# Patient Record
Sex: FEMALE | Race: WHITE | NOT HISPANIC OR LATINO | Employment: OTHER | RURAL
[De-identification: names, ages, dates, MRNs, and addresses within clinical notes are randomized per-mention and may not be internally consistent; named-entity substitution may affect disease eponyms.]

---

## 2021-08-03 ENCOUNTER — INFUSION (OUTPATIENT)
Dept: INFECTIOUS DISEASES | Facility: HOSPITAL | Age: 81
End: 2021-08-03
Attending: FAMILY MEDICINE
Payer: MEDICARE

## 2021-08-03 VITALS
BODY MASS INDEX: 22.15 KG/M2 | WEIGHT: 125 LBS | RESPIRATION RATE: 18 BRPM | OXYGEN SATURATION: 96 % | SYSTOLIC BLOOD PRESSURE: 134 MMHG | DIASTOLIC BLOOD PRESSURE: 55 MMHG | HEIGHT: 63 IN | HEART RATE: 81 BPM | TEMPERATURE: 99 F

## 2021-08-03 DIAGNOSIS — U07.1 COVID-19: Primary | ICD-10-CM

## 2021-08-03 PROCEDURE — 25000003 PHARM REV CODE 250: Performed by: NURSE PRACTITIONER

## 2021-08-03 PROCEDURE — 63600175 PHARM REV CODE 636 W HCPCS: Performed by: NURSE PRACTITIONER

## 2021-08-03 PROCEDURE — M0243 CASIRIVI AND IMDEVI INFUSION: HCPCS | Performed by: NURSE PRACTITIONER

## 2021-08-03 RX ORDER — DIPHENHYDRAMINE HYDROCHLORIDE 50 MG/ML
25 INJECTION INTRAMUSCULAR; INTRAVENOUS ONCE AS NEEDED
Status: DISPENSED | OUTPATIENT
Start: 2021-08-03 | End: 2032-12-30

## 2021-08-03 RX ORDER — ACETAMINOPHEN 325 MG/1
650 TABLET ORAL ONCE AS NEEDED
Status: DISPENSED | OUTPATIENT
Start: 2021-08-03 | End: 2032-12-30

## 2021-08-03 RX ORDER — EPINEPHRINE 0.3 MG/.3ML
0.3 INJECTION SUBCUTANEOUS
Status: DISPENSED | OUTPATIENT
Start: 2021-08-03

## 2021-08-03 RX ORDER — ALBUTEROL SULFATE 90 UG/1
2 AEROSOL, METERED RESPIRATORY (INHALATION)
Status: DISPENSED | OUTPATIENT
Start: 2021-08-03

## 2021-08-03 RX ORDER — ONDANSETRON 4 MG/1
4 TABLET, ORALLY DISINTEGRATING ORAL ONCE AS NEEDED
Status: DISPENSED | OUTPATIENT
Start: 2021-08-03 | End: 2032-12-30

## 2021-08-03 RX ORDER — SODIUM CHLORIDE 0.9 % (FLUSH) 0.9 %
10 SYRINGE (ML) INJECTION
Status: DISPENSED | OUTPATIENT
Start: 2021-08-03

## 2021-08-03 RX ADMIN — CASIRIVIMAB AND IMDEVIMAB 600 MG: 600; 600 INJECTION, SOLUTION, CONCENTRATE INTRAVENOUS at 03:08

## 2021-08-04 ENCOUNTER — TELEPHONE (OUTPATIENT)
Dept: EMERGENCY MEDICINE | Facility: HOSPITAL | Age: 81
End: 2021-08-04

## 2022-05-06 ENCOUNTER — OFFICE VISIT (OUTPATIENT)
Dept: DERMATOLOGY | Facility: CLINIC | Age: 82
End: 2022-05-06
Payer: MEDICARE

## 2022-05-06 VITALS — WEIGHT: 125 LBS | BODY MASS INDEX: 22.15 KG/M2 | HEIGHT: 63 IN | RESPIRATION RATE: 18 BRPM

## 2022-05-06 DIAGNOSIS — L30.9 DERMATITIS, UNSPECIFIED: Primary | ICD-10-CM

## 2022-05-06 PROCEDURE — 99204 PR OFFICE/OUTPT VISIT, NEW, LEVL IV, 45-59 MIN: ICD-10-PCS | Mod: ,,, | Performed by: STUDENT IN AN ORGANIZED HEALTH CARE EDUCATION/TRAINING PROGRAM

## 2022-05-06 PROCEDURE — 99204 OFFICE O/P NEW MOD 45 MIN: CPT | Mod: ,,, | Performed by: STUDENT IN AN ORGANIZED HEALTH CARE EDUCATION/TRAINING PROGRAM

## 2022-05-06 RX ORDER — HYDROCHLOROTHIAZIDE 25 MG/1
25 TABLET ORAL DAILY
COMMUNITY
Start: 2021-11-30

## 2022-05-06 RX ORDER — POTASSIUM CHLORIDE 750 MG/1
10 TABLET, EXTENDED RELEASE ORAL DAILY
COMMUNITY
Start: 2021-11-30

## 2022-05-06 RX ORDER — HYDROCORTISONE 25 MG/G
CREAM TOPICAL
COMMUNITY
Start: 2022-05-03

## 2022-05-06 RX ORDER — DOXYCYCLINE 100 MG/1
100 CAPSULE ORAL EVERY 12 HOURS
Qty: 42 CAPSULE | Refills: 0 | Status: SHIPPED | OUTPATIENT
Start: 2022-05-06 | End: 2022-05-27

## 2022-05-06 NOTE — PATIENT INSTRUCTIONS
Doxycycline Counseling:     Please be aware of the side effects of doxycycline:   - photosensitivity and increased risk for sunburn. When exposed to sunlight, patients should wear protective clothing, sunglasses, and sunscreen.   - birth defects: avoid pregnancy while on therapy due to potential birth defects.  - headaches or vision changes if taking with accutane   - throat irritation: stay upright for at least 30 minutes after taking and drink with a large glass of water   - GI disturbance: take with food to help prevent upset stomach   - Do not take at the same time as dairy or calcium containing supplements, as they reduce absorption. You can continue to consume these, but make sure it is not within an hour of taking the doxycycline     Please call our office with any extreme side effects.

## 2022-05-06 NOTE — PROGRESS NOTES
[facility]  Alejandro Carreon MD    [address] [place] [address]  [phone]    Fax: [phone]    Patient Name: Gisela Cohen  Medical Record Number: [id number]  PCP: Frank Loza MD  Age: 81 y.o. : 1940  Contact: [phone] (home)     CC: Rash  History of Present Illness:     Gisela Cohen is a 81 y.o.  female with no history of skin cancer  who presents to clinic today for rash on face.  This has been present for over three months. She states she noticed it after she was mowing grass and was wearing a mask. Symptoms include redness, swelling, burning. Previous treatments include tacrolimus ointment, hydrocortisone 2.5% cream, mupirocin, clindamycin gel (the only thing that has improved it).       The patient has no other concerns today.    Review of Systems:     Unremarkable other than mentioned above.     Physical Exam:     General: Relaxed, oriented, alert    Skin examination of the scalp, face, neck, chest, back, abdomen, upper extremities and lower extremities were normal except for as listed below                Assessment and Plan:     1. Dermatitis Unspecified  - violaceous scaly plaques on bilateral cheeks.     DDx: Rosacea vs allergic contact dermatitis vs other   - Doxycycline 100 mg BID for three weeks  - will biopsy on next visit if not resolved.   - continue Clindamycin Gel.     Plan: Counseling  I counseled the patient regarding the following:  Skin care: Patient instructed to use gentle skin care including dove unscented soap, CeraVe moisturizing cream, and fragrance free laundry detergent.  Expectations: The patient understands that there is not a definitive diagnosis at this time. Further testing or empiric therapy may be necessary to diagnose and improve the condition.  Contact office if: The patient develops a fever, or rash dramatically worsens despite treatment.      2. High Risk Medication Monitoring : The risks and benefits of the  medication were reviewed in full with the patient. Should any side effects occur, the patient will stop the medication and contact me immediately.    Doxycycline Counseling:     Please be aware of the side effects of doxycycline:   - photosensitivity and increased risk for sunburn. When exposed to sunlight, patients should wear protective clothing, sunglasses, and sunscreen.   - birth defects: avoid pregnancy while on therapy due to potential birth defects.  - headaches or vision changes if taking with accutane   - throat irritation: stay upright for at least 30 minutes after taking and drink with a large glass of water   - GI disturbance: take with food to help prevent upset stomach   - Do not take at the same time as dairy or calcium containing supplements, as they reduce absorption. You can continue to consume these, but make sure it is not within an hour of taking the doxycycline     Please call our office with any extreme side effects.         Return to clinic in three weeks    AVS printed with patient instructions     Alejandro Carreon MD   Mohs Surgery/Dermatologic Oncology  Dermatology

## 2022-05-27 ENCOUNTER — OFFICE VISIT (OUTPATIENT)
Dept: DERMATOLOGY | Facility: CLINIC | Age: 82
End: 2022-05-27
Payer: MEDICARE

## 2022-05-27 VITALS — HEIGHT: 63 IN | WEIGHT: 125 LBS | BODY MASS INDEX: 22.15 KG/M2 | RESPIRATION RATE: 20 BRPM

## 2022-05-27 DIAGNOSIS — L30.9 DERMATITIS: Primary | ICD-10-CM

## 2022-05-27 PROCEDURE — 99214 OFFICE O/P EST MOD 30 MIN: CPT | Mod: ,,, | Performed by: STUDENT IN AN ORGANIZED HEALTH CARE EDUCATION/TRAINING PROGRAM

## 2022-05-27 PROCEDURE — 99214 PR OFFICE/OUTPT VISIT, EST, LEVL IV, 30-39 MIN: ICD-10-PCS | Mod: ,,, | Performed by: STUDENT IN AN ORGANIZED HEALTH CARE EDUCATION/TRAINING PROGRAM

## 2022-05-27 RX ORDER — METRONIDAZOLE 7.5 MG/G
GEL TOPICAL 2 TIMES DAILY
Qty: 45 G | Refills: 5 | Status: SHIPPED | OUTPATIENT
Start: 2022-05-27 | End: 2023-05-27

## 2022-05-27 RX ORDER — CLINDAMYCIN PHOSPHATE 10 MG/G
GEL TOPICAL 2 TIMES DAILY
Qty: 30 G | Refills: 5 | Status: SHIPPED | OUTPATIENT
Start: 2022-05-27

## 2022-05-27 NOTE — PROGRESS NOTES
[facility]  Alejandro Carreon MD    [address] [place] [address], [place] [address]  [phone]    Fax: [phone]    Patient Name: Gisela Cohen  Medical Record Number: [id number]  PCP: Frank Loza MD  Age: 81 y.o. : 1940  Contact: [phone] (home)     History of Present Illness:     Gisela Cohen is a 81 y.o.  female here for follow up of rash of her face favored to be rosacea vs ACD. Patient last seen by dermatology 22. Current therapies include doxycycline 100 mg Bid and clindamycin gel. Patient states she was unable to continue the doxycycline after 2 weeks due to upset stomach. The rash has resolved.     The patient has no other concerns today.    Review of Systems:     Unremarkable other than mentioned above.     Physical Exam:     General: Relaxed, oriented, alert    Skin examination of the scalp, face, neck, chest, back, abdomen, upper extremities and lower extremities were normal except for as listed below      Assessment and Plan:     1. Dermatitis Unspecified  - no rash today   DDX: ACD vs rosace a     Plan:    Metronidazole gel bid PRN    Continue Clindamycin gel as she feels this improves it    Hydrocortisone 2.5% cream PRN for pruritus     Counseling.  Rosacea can be a/w cardiac disease, and can involve the eyes as well.           Return to clinic in 2 months.    AVS printed with patient instructions     Alejandro Carreon MD   Mohs Surgery/Dermatologic Oncology  Dermatology

## 2022-09-02 ENCOUNTER — OFFICE VISIT (OUTPATIENT)
Dept: DERMATOLOGY | Facility: CLINIC | Age: 82
End: 2022-09-02
Payer: MEDICARE

## 2022-09-02 VITALS — HEIGHT: 63 IN | RESPIRATION RATE: 16 BRPM | WEIGHT: 125 LBS | BODY MASS INDEX: 22.15 KG/M2

## 2022-09-02 DIAGNOSIS — L71.9 ROSACEA: Primary | ICD-10-CM

## 2022-09-02 PROCEDURE — 99213 OFFICE O/P EST LOW 20 MIN: CPT | Mod: ,,, | Performed by: STUDENT IN AN ORGANIZED HEALTH CARE EDUCATION/TRAINING PROGRAM

## 2022-09-02 PROCEDURE — 99213 PR OFFICE/OUTPT VISIT, EST, LEVL III, 20-29 MIN: ICD-10-PCS | Mod: ,,, | Performed by: STUDENT IN AN ORGANIZED HEALTH CARE EDUCATION/TRAINING PROGRAM

## 2022-09-02 NOTE — PROGRESS NOTES
[facility]  Alejandro Carreon MD    [address] [place] [address]  [phone]    Fax: [phone]    Patient Name: Gisela Cohen  Medical Record Number: [id number]  PCP: Frank Loza MD  Age: 82 y.o. : 1940  Contact: [phone] (home)     History of Present Illness:     Gisela Cohen is a 82 y.o.  female here for follow up of rosacea. Patient last seen by dermatology 22. Current therapies include metronidazole gel. The rosacea has improved.     The patient has no other concerns today.    Review of Systems:     Unremarkable other than mentioned above.     Physical Exam:     General: Relaxed, oriented, alert    Skin examination of the scalp, face, neck, chest, back, abdomen, upper extremities and lower extremities were normal except for as listed below      Assessment and Plan:     1. Rosacea  - Erythematous papules and pustules on cheeks and nose with background erythema     Plan:    Continue Metronidazole gel bid PRN    Patient will follow up with Dr. Keith for dryness in eye which could represent ocular rosacea     Counseling.  Rosacea can be a/w cardiac disease, and can involve the eyes as well.         Return to clinic in 1 year.    AVS printed with patient instructions     Alejandro Carreon MD   Mohs Surgery/Dermatologic Oncology  Dermatology

## 2022-11-15 ENCOUNTER — OFFICE VISIT (OUTPATIENT)
Dept: DERMATOLOGY | Facility: CLINIC | Age: 82
End: 2022-11-15
Payer: MEDICARE

## 2022-11-15 VITALS — BODY MASS INDEX: 22.15 KG/M2 | HEIGHT: 63 IN | WEIGHT: 125 LBS

## 2022-11-15 DIAGNOSIS — D48.5 NEOPLASM OF UNCERTAIN BEHAVIOR OF SKIN: Primary | ICD-10-CM

## 2022-11-15 DIAGNOSIS — L82.0 SEBORRHEIC KERATOSES, INFLAMED: ICD-10-CM

## 2022-11-15 DIAGNOSIS — L71.9 ROSACEA: ICD-10-CM

## 2022-11-15 PROCEDURE — 11102 PR TANGENTIAL BIOPSY, SKIN, SINGLE LESION: ICD-10-PCS | Mod: XS,,, | Performed by: STUDENT IN AN ORGANIZED HEALTH CARE EDUCATION/TRAINING PROGRAM

## 2022-11-15 PROCEDURE — 11102 TANGNTL BX SKIN SINGLE LES: CPT | Mod: XS,,, | Performed by: STUDENT IN AN ORGANIZED HEALTH CARE EDUCATION/TRAINING PROGRAM

## 2022-11-15 PROCEDURE — 99213 OFFICE O/P EST LOW 20 MIN: CPT | Mod: 25,,, | Performed by: STUDENT IN AN ORGANIZED HEALTH CARE EDUCATION/TRAINING PROGRAM

## 2022-11-15 PROCEDURE — 88305 TISSUE EXAM BY PATHOLOGIST: CPT | Mod: 26,,, | Performed by: PATHOLOGY

## 2022-11-15 PROCEDURE — 88305 TISSUE EXAM BY PATHOLOGIST: CPT | Mod: SUR | Performed by: STUDENT IN AN ORGANIZED HEALTH CARE EDUCATION/TRAINING PROGRAM

## 2022-11-15 PROCEDURE — 88305 PATHOLOGY, DERMATOLOGY: ICD-10-PCS | Mod: 26,,, | Performed by: PATHOLOGY

## 2022-11-15 PROCEDURE — 99213 PR OFFICE/OUTPT VISIT, EST, LEVL III, 20-29 MIN: ICD-10-PCS | Mod: 25,,, | Performed by: STUDENT IN AN ORGANIZED HEALTH CARE EDUCATION/TRAINING PROGRAM

## 2022-11-15 PROCEDURE — 17110 PR DESTRUCTION BENIGN LESIONS UP TO 14: ICD-10-PCS | Mod: ,,, | Performed by: STUDENT IN AN ORGANIZED HEALTH CARE EDUCATION/TRAINING PROGRAM

## 2022-11-15 PROCEDURE — 17110 DESTRUCTION B9 LES UP TO 14: CPT | Mod: ,,, | Performed by: STUDENT IN AN ORGANIZED HEALTH CARE EDUCATION/TRAINING PROGRAM

## 2022-11-15 NOTE — PROGRESS NOTES
[facility]  Alejandro Carreon MD    [address] [place] [address]  [phone]    Fax: [phone]    Patient Name: Gisela Cohen  Medical Record Number: [id number]  PCP: Frank Loza MD  Age: 82 y.o. : 1940  Contact: [phone] (home)     History of Present Illness:     Gisela Cohen is a 82 y.o.  female here for follow up of rosacea. Previous treatment includes  Metronidazole gel bid PRN with improvement. Today, she is complaining of a skin lesion of the mid chest that is tender.     The patient has no other concerns today.    Review of Systems:     Unremarkable other than mentioned above.     Physical Exam:     General: Relaxed, oriented, alert    Skin examination of the scalp, face, neck, chest, back, abdomen, upper extremities and lower extremities were normal except for as listed below        Assessment and Plan:     1. Neoplasm of Uncertain Behavior   - scaly plaque located on the mid chest  Ddx includes: SCC vs ISK    Shave biopsy      Pre-procedure Diagnosis: as above  Post_procedure Diagnosis: same  Estimated Blood Loss: <1cc    Findings: None  Complications: None  Specimens: to pathology      Written informed consent was obtained after discussing risks including pain, bleeding, infection, recurrence and scarring. The biopsy site was sterilely prepped with alcohol, which was allowed to dry completely, then locally infiltrated with 1% lidocaine with epinephrine, ~3 cc total. A shave biopsy was obtained using a Dermablade/15 and the specimen was sent to dermatopathology. Aluminum chloride was used for hemostasis. Antibiotic ointment and a clean dressing were applied. The patient tolerated the procedure well without complications. Verbal and written wound care instructions were given.    Alejandro Carreon MD     2. Irritated Seborrheic Keratoses (L82.0)  Stuck-on inflamed papules with crust located on the cheek   Associated diagnoses: Pruritus and Cutaneous  Inflammation    Plan: Liquid Nitrogen.  A total of 1 lesions were treated with liquid nitrogen, located on the above listed location.  This procedure was medically necessary because the lesions that were treated were: irritated and itchy. The  patient's consent was obtained including but not limited to risks of crusting, scabbing, blistering, scarring, darker  or lighter pigmentary change, recurrence, incomplete removal and infection.    3. Rosacea  - Erythematous papules and pustules on cheeks and nose with background erythema      Plan:    Continue Metronidazole gel bid PRN    Patient will follow up with Dr. Keith for dryness in eye which could represent ocular rosacea      Counseling.  Rosacea can be a/w cardiac disease, and can involve the eyes as well.   Return to clinic in 1 year.     AVS printed with patient instructions     Alejandro Carreon MD   Mohs Surgery/Dermatologic Oncology  Dermatology

## 2022-11-17 LAB
ESTROGEN SERPL-MCNC: NORMAL PG/ML
INSULIN SERPL-ACNC: NORMAL U[IU]/ML
LAB AP GROSS DESCRIPTION: NORMAL
LAB AP LABORATORY NOTES: NORMAL
LAB AP SPEC A DDX: NORMAL
LAB AP SPEC A MORPHOLOGY: NORMAL
LAB AP SPEC A PROCEDURE: NORMAL
T3RU NFR SERPL: NORMAL %

## 2022-11-22 NOTE — PATIENT INSTRUCTIONS

## 2022-11-23 ENCOUNTER — PROCEDURE VISIT (OUTPATIENT)
Dept: DERMATOLOGY | Facility: CLINIC | Age: 82
End: 2022-11-23
Payer: MEDICARE

## 2022-11-23 VITALS — DIASTOLIC BLOOD PRESSURE: 78 MMHG | SYSTOLIC BLOOD PRESSURE: 138 MMHG

## 2022-11-23 DIAGNOSIS — C44.529 SQUAMOUS CELL CARCINOMA OF SKIN OF CHEST: Primary | ICD-10-CM

## 2022-11-23 PROCEDURE — 12032 INTMD RPR S/A/T/EXT 2.6-7.5: CPT | Mod: ,,, | Performed by: STUDENT IN AN ORGANIZED HEALTH CARE EDUCATION/TRAINING PROGRAM

## 2022-11-23 PROCEDURE — 11602 EXC TR-EXT MAL+MARG 1.1-2 CM: CPT | Mod: 51,,, | Performed by: STUDENT IN AN ORGANIZED HEALTH CARE EDUCATION/TRAINING PROGRAM

## 2022-11-23 PROCEDURE — 99499 UNLISTED E&M SERVICE: CPT | Mod: ,,, | Performed by: STUDENT IN AN ORGANIZED HEALTH CARE EDUCATION/TRAINING PROGRAM

## 2022-11-23 PROCEDURE — 88305 TISSUE EXAM BY PATHOLOGIST: CPT | Mod: 26,,, | Performed by: PATHOLOGY

## 2022-11-23 PROCEDURE — 88305 PATHOLOGY, DERMATOLOGY: ICD-10-PCS | Mod: 26,,, | Performed by: PATHOLOGY

## 2022-11-23 PROCEDURE — 12032 PR LAYR CLOS WND TRUNK,ARM,LEG 2.6-7.5 CM: ICD-10-PCS | Mod: ,,, | Performed by: STUDENT IN AN ORGANIZED HEALTH CARE EDUCATION/TRAINING PROGRAM

## 2022-11-23 PROCEDURE — 88305 TISSUE EXAM BY PATHOLOGIST: CPT | Mod: SUR | Performed by: STUDENT IN AN ORGANIZED HEALTH CARE EDUCATION/TRAINING PROGRAM

## 2022-11-23 PROCEDURE — 11602 PR EXC SKIN MALIG 1.1-2 CM TRUNK,ARM,LEG: ICD-10-PCS | Mod: 51,,, | Performed by: STUDENT IN AN ORGANIZED HEALTH CARE EDUCATION/TRAINING PROGRAM

## 2022-11-23 PROCEDURE — 99499 NO LOS: ICD-10-PCS | Mod: ,,, | Performed by: STUDENT IN AN ORGANIZED HEALTH CARE EDUCATION/TRAINING PROGRAM

## 2022-11-23 NOTE — PROGRESS NOTES
Excision Consult Note    Gisela Cohen is a 82 y.o. female who is referred by Dr. Carreon for evaluation of a SCC on the mid chest.     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    Pathologic Findings:  Accession # [id number]  Diagnosis: SCC    Assessment and Plan:  Treatment Options : Given the indications and high cure rate, the patient has agreed to proceed with excision  Risks and Benefits : The rationale for excision was explained to the patient. The risks and benefits to therapy were discussed in detail. Specifically, the risks of infection, scarring, bleeding, dehiscence, hematoma, prolonged wound healing, incomplete removal, allergy to anesthesia, nerve injury, inability to clear the lesion and recurrence were addressed. The treatment site was clearly identified and confirmed by the patient.    Plan:  Excision    Alejandro Carreon MD   Mohs Surgery/Dermatologic Oncology

## 2022-11-23 NOTE — PROGRESS NOTES
[facility]    Alejandro Carreon MD    Elliptical Excision with Intermediate Closure    Tumor Type: SCC  Location:  mid chest  Derm-Path Accession #:  [id number]  Lesion Size:  1.0 x 0.8 cm  Surgical Margins: 0.5 cm  Post op size: 2.0 x 1.8 cm  Level of Defect:  fat  Repair Type:  Intermediate linear   Repair Length:  5 cm   Sutures: 3-0 PDS, 4-0 Prolene    Primary Surgeon: DUDLEY Carreon MD      INDICATIONS:  The risks of bleeding, infection, discomfort, incomplete removal, and scar formation were explained to the patient.  All questions were answered.  After informed consent, confirmation of site and identity, and appropriate instructions, the patient underwent the procedure as follows:    PROCEDURE:  With the patient in a supine position, the lesion was outlined with the above margins. An ellipse was designed around the lesion to conform to relaxed skin tension lines in an effort to minimize scarring and deformity.  The patient was then placed in a supine position.  The lesion and surrounding skin were prepped with chlorhexidine, draped, and anesthetized with 1% lidocaine with epinephrine 1:100,100 buffered with 1:10 sodium bicarbonate.  Using a #15 blade, the skin was excised along premarked lines.  The resulting defect extended through deep subcutaneous tissue.  Wound margins were undermined to limit functional deformity/impairment of adjacent structures.  Bleeding vessels were controlled with  monopolar  electrodessication .  The dermis and subcutaneous tissue were closed with buried vertical mattress sutures.  Epidermal approximation was meticulously refined with simple running sutures, resulting in a linear closure with little to no wound tension.  Blood loss was estimated to be less than 5cc.  The area was coated with petrolatum and covered with a non-adherent dressing followed by gauze and tape.  Postoperative instructions were reviewed per protocol.  The patient left alert and fully  oriented.        Alejandro Carreon MD

## 2022-12-05 ENCOUNTER — CLINICAL SUPPORT (OUTPATIENT)
Dept: DERMATOLOGY | Facility: CLINIC | Age: 82
End: 2022-12-05
Payer: MEDICARE

## 2022-12-05 DIAGNOSIS — Z48.89 ENCOUNTER FOR POST SURGICAL WOUND CHECK: ICD-10-CM

## 2022-12-05 DIAGNOSIS — L82.0 INFLAMED SEBORRHEIC KERATOSIS: Primary | ICD-10-CM

## 2022-12-05 PROCEDURE — 17110 DESTRUCTION B9 LES UP TO 14: CPT | Mod: ,,, | Performed by: STUDENT IN AN ORGANIZED HEALTH CARE EDUCATION/TRAINING PROGRAM

## 2022-12-05 PROCEDURE — 17110 PR DESTRUCTION BENIGN LESIONS UP TO 14: ICD-10-PCS | Mod: ,,, | Performed by: STUDENT IN AN ORGANIZED HEALTH CARE EDUCATION/TRAINING PROGRAM

## 2022-12-05 PROCEDURE — 99499 NO LOS: ICD-10-PCS | Mod: ,,, | Performed by: STUDENT IN AN ORGANIZED HEALTH CARE EDUCATION/TRAINING PROGRAM

## 2022-12-05 PROCEDURE — 99499 UNLISTED E&M SERVICE: CPT | Mod: ,,, | Performed by: STUDENT IN AN ORGANIZED HEALTH CARE EDUCATION/TRAINING PROGRAM

## 2022-12-05 NOTE — PROGRESS NOTES
Elliptical Excision with Intermediate Closure     Tumor Type: SCC  Location:  mid chest  Derm-Path Accession #:  [id number]  Lesion Size:  1.0 x 0.8 cm  Surgical Margins: 0.5 cm  Post op size: 2.0 x 1.8 cm  Level of Defect:  fat  Repair Type:  Intermediate linear   Repair Length:  5 cm   Sutures: 3-0 PDS, 4-0 Prolene    Sutures removed. Patient tolerated well. Incision site healing well.     Aylin Oneal RN        Patient also complains of pruritic, irritated lesions on R cheek and mid back.     Irritated Seborrheic Keratoses (L82.0)  Stuck-on inflamed papules with crust located on the L cheek and mid back  Associated diagnoses: Pruritus and Cutaneous Inflammation    Plan: Liquid Nitrogen.  A total of 2 lesions were treated with liquid nitrogen, located on the above listed location.  This procedure was medically necessary because the lesions that were treated were: irritated and itchy. The  patient's consent was obtained including but not limited to risks of crusting, scabbing, blistering, scarring, darker  or lighter pigmentary change, recurrence, incomplete removal and infection.      Alejandro Carreon MD   Mohs Surgery/Dermatologic Oncology

## 2023-08-31 ENCOUNTER — OFFICE VISIT (OUTPATIENT)
Dept: DERMATOLOGY | Facility: CLINIC | Age: 83
End: 2023-08-31
Payer: MEDICARE

## 2023-08-31 DIAGNOSIS — L82.1 SEBORRHEIC KERATOSES: ICD-10-CM

## 2023-08-31 DIAGNOSIS — L90.9 ATROPHY, SKIN: ICD-10-CM

## 2023-08-31 DIAGNOSIS — D17.1 LIPOMA OF BACK: ICD-10-CM

## 2023-08-31 DIAGNOSIS — D23.9 DERMATOFIBROMA: ICD-10-CM

## 2023-08-31 DIAGNOSIS — L73.8 SEBACEOUS HYPERPLASIA: ICD-10-CM

## 2023-08-31 DIAGNOSIS — L21.9 SEBORRHEIC DERMATITIS: ICD-10-CM

## 2023-08-31 DIAGNOSIS — Z85.828 HISTORY OF NONMELANOMA SKIN CANCER: Primary | ICD-10-CM

## 2023-08-31 PROCEDURE — 99214 PR OFFICE/OUTPT VISIT, EST, LEVL IV, 30-39 MIN: ICD-10-PCS | Mod: ,,, | Performed by: STUDENT IN AN ORGANIZED HEALTH CARE EDUCATION/TRAINING PROGRAM

## 2023-08-31 PROCEDURE — 99214 OFFICE O/P EST MOD 30 MIN: CPT | Mod: ,,, | Performed by: STUDENT IN AN ORGANIZED HEALTH CARE EDUCATION/TRAINING PROGRAM

## 2023-08-31 NOTE — PROGRESS NOTES
[facility]  Alejandro Carreon MD    [address] [place] [address]  [phone]    Fax: [phone]    Patient Name: Gisela Cohen  Medical Record Number: [id number]  PCP: Frank Loza MD  Age: 83 y.o. : 1940  Contact: [phone] (home)     History of Present Illness:     Gisela Cohen is a 83 y.o.  female here for follow up of SCC mid chest s/p Mohs 22 with a well healed scar. Today, patient complains about a skin lesion on the left leg that has been present since march. Patient states during excessive the skin lesion was swollen and red, but now is flat.     The patient has no other concerns today.    Review of Systems:     Unremarkable other than mentioned above.     Physical Exam:     General: Relaxed, oriented, alert    Skin examination of the scalp, face, neck, chest, back, abdomen, upper extremities and lower extremities were normal except for as listed below      Assessment and Plan:     1. Atrophy of skin   - assurance     2. Dermatofibroma   - dome-shaped pink-tan nodule with positive dimple sign on the left leg    Plan: Reassure  Dermatofibromas are benign. They should be surgically removed if symptomatic or if they grow.    3. Seborrheic Dermatitis   - Red scaly plaques located on the scalp, nasolabial folds, and eyebrows    Plan:   - lidex solution to ears bid PRN     4.  Lipoma   - soft, mobile nodule on the back    Plan:  - favor benign clinically   - will schedule excision if desired      5. Sebaceous Hyperplasia  - orange-yellow papules with telangiectasia  - Patient was educated on the benign nature of these.     6. Seborrheic keratoses   - brown stuck on appearing papules/plaques  - patient educated on benign nature. No treatment necessary unless they become irritated or inflamed     7. History of NMSC   Well-healed scar on mid chest  No e/o recurrence   Recommend sunscreen and good photoprotection     Return to clinic in 1 year     AVS  printed with patient instructions     Alejandro Carreon MD   Mohs Surgery/Dermatologic Oncology  Dermatology

## 2023-09-01 RX ORDER — FLUOCINONIDE TOPICAL SOLUTION USP, 0.05% 0.5 MG/ML
SOLUTION TOPICAL 2 TIMES DAILY
Qty: 60 ML | Refills: 5 | Status: SHIPPED | OUTPATIENT
Start: 2023-09-01